# Patient Record
Sex: MALE | URBAN - METROPOLITAN AREA
[De-identification: names, ages, dates, MRNs, and addresses within clinical notes are randomized per-mention and may not be internally consistent; named-entity substitution may affect disease eponyms.]

---

## 2021-02-01 ENCOUNTER — TELEPHONE (OUTPATIENT)
Dept: FAMILY MEDICINE | Facility: CLINIC | Age: 51
End: 2021-02-01

## 2021-02-01 NOTE — TELEPHONE ENCOUNTER
See note in spouse's MyChart message in her chart copied here:      Good morning,  My  just received this form over the weekend (he is a teacher) and we are wondering if you would be able to do this for him/us?  My cardiologist office said they don't do forms, vaccine info, etc and said everything is running through primary clinics.  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  If you have any questions, please let me know. Sorry to have to bug you with this.  Denise      I verified Rafa's information to start phone call.   Does not appear he's ever been seen?   I did advise spouse in her chart/message that perhaps he has an alternate provider/primary provider who can address his forms?    Valeria Thomas RN  [facility]